# Patient Record
Sex: FEMALE | Race: BLACK OR AFRICAN AMERICAN | NOT HISPANIC OR LATINO | URBAN - METROPOLITAN AREA
[De-identification: names, ages, dates, MRNs, and addresses within clinical notes are randomized per-mention and may not be internally consistent; named-entity substitution may affect disease eponyms.]

---

## 2022-03-15 ENCOUNTER — OFFICE VISIT (OUTPATIENT)
Dept: URBAN - METROPOLITAN AREA CLINIC 40 | Facility: CLINIC | Age: 57
End: 2022-03-15
Payer: SELF-PAY

## 2022-03-15 ENCOUNTER — WEB ENCOUNTER (OUTPATIENT)
Dept: URBAN - METROPOLITAN AREA CLINIC 40 | Facility: CLINIC | Age: 57
End: 2022-03-15

## 2022-03-15 VITALS
SYSTOLIC BLOOD PRESSURE: 104 MMHG | WEIGHT: 227 LBS | DIASTOLIC BLOOD PRESSURE: 62 MMHG | TEMPERATURE: 98.7 F | HEART RATE: 55 BPM | HEIGHT: 61 IN | BODY MASS INDEX: 42.86 KG/M2

## 2022-03-15 DIAGNOSIS — Z80.0 FAMILY HISTORY OF RECTAL CANCER: ICD-10-CM

## 2022-03-15 DIAGNOSIS — R13.10 ODYNOPHAGIA: ICD-10-CM

## 2022-03-15 DIAGNOSIS — Z90.49 HISTORY OF APPENDECTOMY: ICD-10-CM

## 2022-03-15 DIAGNOSIS — E66.9 OBESITY (BMI 30-39.9): ICD-10-CM

## 2022-03-15 DIAGNOSIS — K21.9 GERD: ICD-10-CM

## 2022-03-15 PROBLEM — 414916001 OBESITY: Status: ACTIVE | Noted: 2022-03-15

## 2022-03-15 PROBLEM — 428251008 HISTORY OF APPENDECTOMY: Status: ACTIVE | Noted: 2022-03-15

## 2022-03-15 PROCEDURE — 99203 OFFICE O/P NEW LOW 30 MIN: CPT | Performed by: PHYSICIAN ASSISTANT

## 2022-03-15 RX ORDER — HYDROCHLOROTHIAZIDE 25 MG/1
1 TABLET IN THE MORNING TABLET ORAL ONCE A DAY
Status: ACTIVE | COMMUNITY

## 2022-03-15 RX ORDER — TELMISARTAN AND HYDROCHLOROTHIAZIDE 12.5; 8 MG/1; MG/1
1 TABLET TABLET ORAL ONCE A DAY
Status: ACTIVE | COMMUNITY

## 2022-03-15 RX ORDER — METOPROLOL SUCCINATE 50 MG/1
1 TABLET TABLET, FILM COATED, EXTENDED RELEASE ORAL ONCE A DAY
Status: ACTIVE | COMMUNITY

## 2022-03-15 RX ORDER — PANTOPRAZOLE SODIUM 40 MG/1
1 TABLET TABLET, DELAYED RELEASE ORAL ONCE A DAY
OUTPATIENT

## 2022-03-15 RX ORDER — PANTOPRAZOLE SODIUM 40 MG/1
1 TABLET TABLET, DELAYED RELEASE ORAL ONCE A DAY
Status: ACTIVE | COMMUNITY

## 2022-03-15 NOTE — HPI-TODAY'S VISIT:
Ms Bergeron is a 56-year-old black female who presents to the office today to discuss scheduling upper endoscopy.  Long history of GERD, obesity, hypertension, hyperlipidemia.  Admits that she has learned to avoid foods such as tomato-based products which cause more heartburn reflux and is taking pantoprazole 40 mg daily.  Formally followed by Dr. Olivares in the area, she had a reportedly normal colonoscopy in the last 2 to 3 years.  Family history of rectal cancer in her sister.  She also had an EGD years ago for follow-up of GERD but does not have reports of that procedure.  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  Occasional halitosis with this that she attributes to the fishbone.  Has had an episode of "coughing up something" and believes it may have been flash irritated by the fishbone.  Otherwise as mentioned, no significant dysphagia to foods or pills.  Denies significant abdominal pain.  Normal bowel habits without any hematochezia or melena.  History of appendectomy last November.  Covid infection in the last 2 years.  Non-smoker.  Does not drink alcohol.  No use of NSAIDs.

## 2022-03-15 NOTE — PHYSICAL EXAM CONSTITUTIONAL:
well developed, obese, in no acute distress , ambulating without difficulty , normal communication ability

## 2022-03-18 PROBLEM — 235595009 GASTROESOPHAGEAL REFLUX DISEASE: Status: ACTIVE | Noted: 2022-03-15

## 2022-04-04 ENCOUNTER — OFFICE VISIT (OUTPATIENT)
Dept: URBAN - METROPOLITAN AREA SURGERY CENTER 30 | Facility: SURGERY CENTER | Age: 57
End: 2022-04-04
Payer: SELF-PAY

## 2022-04-04 ENCOUNTER — CLAIMS CREATED FROM THE CLAIM WINDOW (OUTPATIENT)
Dept: URBAN - METROPOLITAN AREA CLINIC 4 | Facility: CLINIC | Age: 57
End: 2022-04-04
Payer: SELF-PAY

## 2022-04-04 DIAGNOSIS — K29.60 ADENOPAPILLOMATOSIS GASTRICA: ICD-10-CM

## 2022-04-04 DIAGNOSIS — B96.81 BACTERIAL INFECTION DUE TO H. PYLORI: ICD-10-CM

## 2022-04-04 DIAGNOSIS — B96.81 HELICOBACTER PYLORI [H. PYLORI] AS THE CAUSE OF DISEASES CLASSIFIED ELSEWHERE: ICD-10-CM

## 2022-04-04 DIAGNOSIS — K31.A11 INTESTINAL METAPLASIA OF ANTRUM OF STOMACH WITHOUT DYSPLASIA: ICD-10-CM

## 2022-04-04 DIAGNOSIS — K21.9 GASTRO-ESOPHAGEAL REFLUX DISEASE WITHOUT ESOPHAGITIS: ICD-10-CM

## 2022-04-04 DIAGNOSIS — K29.60 OTHER GASTRITIS WITHOUT BLEEDING: ICD-10-CM

## 2022-04-04 DIAGNOSIS — K21.9 ACID REFLUX: ICD-10-CM

## 2022-04-04 PROCEDURE — 88305 TISSUE EXAM BY PATHOLOGIST: CPT | Performed by: PATHOLOGY

## 2022-04-04 PROCEDURE — 43239 EGD BIOPSY SINGLE/MULTIPLE: CPT | Performed by: INTERNAL MEDICINE

## 2022-04-04 PROCEDURE — 88342 IMHCHEM/IMCYTCHM 1ST ANTB: CPT | Performed by: PATHOLOGY

## 2022-04-04 PROCEDURE — G8907 PT DOC NO EVENTS ON DISCHARG: HCPCS | Performed by: INTERNAL MEDICINE

## 2022-04-04 PROCEDURE — 88312 SPECIAL STAINS GROUP 1: CPT | Performed by: PATHOLOGY

## 2022-04-21 ENCOUNTER — TELEPHONE ENCOUNTER (OUTPATIENT)
Dept: URBAN - METROPOLITAN AREA CLINIC 40 | Facility: CLINIC | Age: 57
End: 2022-04-21

## 2022-04-21 RX ORDER — CLARITHROMYCIN 500 MG/1
1 TABLET TABLET, FILM COATED ORAL
Qty: 28 TABLET | Refills: 0 | OUTPATIENT
Start: 2022-04-21 | End: 2022-05-05

## 2022-04-21 RX ORDER — AMOXICILLIN 500 MG/1
2 CAPSULES CAPSULE ORAL
Qty: 56 CAPSULE | Refills: 0 | OUTPATIENT
Start: 2022-04-21 | End: 2022-05-05

## 2022-05-03 ENCOUNTER — OFFICE VISIT (OUTPATIENT)
Dept: URBAN - METROPOLITAN AREA CLINIC 40 | Facility: CLINIC | Age: 57
End: 2022-05-03
Payer: SELF-PAY

## 2022-05-03 ENCOUNTER — LAB OUTSIDE AN ENCOUNTER (OUTPATIENT)
Dept: URBAN - METROPOLITAN AREA CLINIC 40 | Facility: CLINIC | Age: 57
End: 2022-05-03

## 2022-05-03 DIAGNOSIS — R13.10 ODYNOPHAGIA: ICD-10-CM

## 2022-05-03 DIAGNOSIS — B96.81 HELICOBACTER PYLORI [H. PYLORI] AS THE CAUSE OF DISEASES CLASSIFIED ELSEWHERE: ICD-10-CM

## 2022-05-03 PROBLEM — 30233002 ODYNOPHAGIA: Status: ACTIVE | Noted: 2022-03-15

## 2022-05-03 PROCEDURE — 99214 OFFICE O/P EST MOD 30 MIN: CPT | Performed by: INTERNAL MEDICINE

## 2022-05-03 RX ORDER — HYDROCHLOROTHIAZIDE 25 MG/1
1 TABLET IN THE MORNING TABLET ORAL ONCE A DAY
Status: ACTIVE | COMMUNITY

## 2022-05-03 RX ORDER — TELMISARTAN AND HYDROCHLOROTHIAZIDE 12.5; 8 MG/1; MG/1
1 TABLET TABLET ORAL ONCE A DAY
Status: ACTIVE | COMMUNITY

## 2022-05-03 RX ORDER — CLARITHROMYCIN 500 MG/1
1 TABLET TABLET, FILM COATED ORAL
OUTPATIENT
Start: 2022-04-21

## 2022-05-03 RX ORDER — METOPROLOL SUCCINATE 50 MG/1
1 TABLET TABLET, FILM COATED, EXTENDED RELEASE ORAL ONCE A DAY
Status: ACTIVE | COMMUNITY

## 2022-05-03 RX ORDER — PANTOPRAZOLE SODIUM 40 MG/1
1 TABLET TABLET, DELAYED RELEASE ORAL ONCE A DAY
OUTPATIENT

## 2022-05-03 RX ORDER — SUCRALFATE 1 G/1
1 TABLET DISSOLVED IN 4 OUNCES OF WATER ON AN EMPTY STOMACH TABLET ORAL TWICE A DAY
Qty: 60 | Refills: 1 | OUTPATIENT
Start: 2022-05-03 | End: 2022-07-02

## 2022-05-03 RX ORDER — PANTOPRAZOLE SODIUM 40 MG/1
1 TABLET TABLET, DELAYED RELEASE ORAL ONCE A DAY
Status: ACTIVE | COMMUNITY

## 2022-05-03 RX ORDER — CLARITHROMYCIN 500 MG/1
1 TABLET TABLET, FILM COATED ORAL
Qty: 28 TABLET | Refills: 0 | Status: ACTIVE | COMMUNITY
Start: 2022-04-21 | End: 2022-05-05

## 2022-05-03 RX ORDER — AMOXICILLIN 500 MG/1
2 CAPSULES CAPSULE ORAL
OUTPATIENT
Start: 2022-04-21

## 2022-05-03 RX ORDER — AMOXICILLIN 500 MG/1
2 CAPSULES CAPSULE ORAL
Qty: 56 CAPSULE | Refills: 0 | Status: ACTIVE | COMMUNITY
Start: 2022-04-21 | End: 2022-05-05

## 2022-05-03 NOTE — HPI-TODAY'S VISIT:
Ms. Figueroa presents to clinic for follow-up.  She was seen on March 15 complaining of odynophagia and halitosis.  She was started on pantoprazole and an EGD was obtained.  EGD on April 4 was significant for a small hiatal hernia.  She had reflux esophagitis and H. pylori positive gastritis.  She has been started on triple therapy.  She started about a week of therapy.  Patient states she still having the sensation of feeling like something is in her throat.  She denies any nausea or abdominal pain.

## 2022-05-03 NOTE — PHYSICAL EXAM CONSTITUTIONAL:
Obese BF well developed, well nourished , in no acute distress , ambulating without difficulty , normal communication ability

## 2022-05-24 ENCOUNTER — OFFICE VISIT (OUTPATIENT)
Dept: URBAN - METROPOLITAN AREA CLINIC 39 | Facility: CLINIC | Age: 57
End: 2022-05-24

## 2022-06-29 ENCOUNTER — ERX REFILL RESPONSE (OUTPATIENT)
Dept: URBAN - METROPOLITAN AREA CLINIC 40 | Facility: CLINIC | Age: 57
End: 2022-06-29

## 2022-06-29 RX ORDER — SUCRALFATE 1 G/1
TAKE ONE TABLET DISSOLVED IN 4 OUNCES OF WATER ON AN AMPTY STOMACH TWO TIMES A DAY TABLET ORAL
Qty: 60 TABLET | Refills: 0 | OUTPATIENT

## 2022-06-29 RX ORDER — SUCRALFATE 1 G/1
1 TABLET DISSOLVED IN 4 OUNCES OF WATER ON AN EMPTY STOMACH TABLET ORAL TWICE A DAY
Qty: 60 | Refills: 1 | OUTPATIENT

## 2022-08-26 ENCOUNTER — ERX REFILL RESPONSE (OUTPATIENT)
Dept: URBAN - METROPOLITAN AREA CLINIC 40 | Facility: CLINIC | Age: 57
End: 2022-08-26

## 2022-08-26 RX ORDER — SUCRALFATE 1 G/1
TAKE ONE TABLET DISSOLVED IN 4 OUNCES OF WATER ON AN EMPTY STOMACH TWO TIMES A DAY TABLET ORAL
Qty: 60 TABLET | Refills: 0 | OUTPATIENT

## 2022-10-03 ENCOUNTER — ERX REFILL RESPONSE (OUTPATIENT)
Dept: URBAN - METROPOLITAN AREA CLINIC 40 | Facility: CLINIC | Age: 57
End: 2022-10-03

## 2022-10-03 RX ORDER — SUCRALFATE 1 G/1
TAKE ONE TABLET DISSOLVED IN 4 OUNCES OF WATER BY MOUTH ON AN EMPTY STOMACH TWO TIMES A DAY TABLET ORAL
Qty: 60 TABLET | Refills: 0 | OUTPATIENT

## 2022-11-28 ENCOUNTER — ERX REFILL RESPONSE (OUTPATIENT)
Dept: URBAN - METROPOLITAN AREA CLINIC 40 | Facility: CLINIC | Age: 57
End: 2022-11-28

## 2022-11-28 RX ORDER — SUCRALFATE 1 G/1
TAKE 1 TABLET AND DISSOLVED IN 4 OZ OF WATER BY MOUTH ON AN EMPTY STOMACH TWO TIMES A DAY FOR 30 DAYS TABLET ORAL
Qty: 60 TABLET | Refills: 0 | OUTPATIENT

## 2023-01-04 ENCOUNTER — DASHBOARD ENCOUNTERS (OUTPATIENT)
Age: 58
End: 2023-01-04

## 2023-01-04 ENCOUNTER — OFFICE VISIT (OUTPATIENT)
Dept: URBAN - METROPOLITAN AREA CLINIC 40 | Facility: CLINIC | Age: 58
End: 2023-01-04
Payer: SELF-PAY

## 2023-01-04 VITALS
DIASTOLIC BLOOD PRESSURE: 80 MMHG | HEART RATE: 73 BPM | BODY MASS INDEX: 41.54 KG/M2 | WEIGHT: 220 LBS | HEIGHT: 61 IN | SYSTOLIC BLOOD PRESSURE: 132 MMHG

## 2023-01-04 DIAGNOSIS — R10.13 DYSPEPSIA: ICD-10-CM

## 2023-01-04 DIAGNOSIS — Z86.19 HISTORY OF HELICOBACTER PYLORI INFECTION: ICD-10-CM

## 2023-01-04 DIAGNOSIS — A04.8 HELICOBACTER PYLORI (H. PYLORI): ICD-10-CM

## 2023-01-04 PROCEDURE — 99214 OFFICE O/P EST MOD 30 MIN: CPT | Performed by: INTERNAL MEDICINE

## 2023-01-04 RX ORDER — PANTOPRAZOLE SODIUM 40 MG/1
1 TABLET TABLET, DELAYED RELEASE ORAL ONCE A DAY
Qty: 90 | Refills: 0

## 2023-01-04 RX ORDER — AMOXICILLIN 500 MG/1
2 CAPSULES CAPSULE ORAL
Status: ON HOLD | COMMUNITY
Start: 2022-04-21

## 2023-01-04 RX ORDER — HYDROCHLOROTHIAZIDE 25 MG/1
1 TABLET IN THE MORNING TABLET ORAL ONCE A DAY
Status: ACTIVE | COMMUNITY

## 2023-01-04 RX ORDER — SUCRALFATE 1 G/1
TAKE 1 TABLET AND DISSOLVED IN 4 OZ OF WATER BY MOUTH ON AN EMPTY STOMACH TWO TIMES A DAY FOR 30 DAYS TABLET ORAL
Qty: 60 TABLET | Refills: 0 | Status: ON HOLD | COMMUNITY

## 2023-01-04 RX ORDER — CLARITHROMYCIN 500 MG/1
1 TABLET TABLET, FILM COATED ORAL
Status: ON HOLD | COMMUNITY
Start: 2022-04-21

## 2023-01-04 RX ORDER — SUCRALFATE 1 G/1
1 TABLET ON AN EMPTY STOMACH TABLET ORAL TWICE A DAY
Qty: 180 TABLET | Refills: 0

## 2023-01-04 RX ORDER — TELMISARTAN AND HYDROCHLOROTHIAZIDE 12.5; 8 MG/1; MG/1
1 TABLET TABLET ORAL ONCE A DAY
Status: ACTIVE | COMMUNITY

## 2023-01-04 RX ORDER — PANTOPRAZOLE SODIUM 40 MG/1
1 TABLET TABLET, DELAYED RELEASE ORAL ONCE A DAY
Status: ON HOLD | COMMUNITY

## 2023-01-04 RX ORDER — METOPROLOL SUCCINATE 50 MG/1
1 TABLET TABLET, FILM COATED, EXTENDED RELEASE ORAL ONCE A DAY
Status: ACTIVE | COMMUNITY

## 2023-01-04 NOTE — HPI-TODAY'S VISIT:
Ms. Figueroa presents to clinic for follow-up.  She was last seen in May 2022.  Recall patient had an EGD in April significant for hiatal hernia, reflux esophagitis and H. pylori positive gastritis.  She was treated with triple therapy.  H. pylori breath testing was ordered but patient did not return to have this done.  Patient states she felt better with the antibiotics.  She subsequently had an upper respiratory infection had a different set of antibiotics.  She was okay until 2 weeks ago when she started to have the same symptoms in terms of nausea and dyspepsia.  He is currently moving her bowels daily.  She is not on any acid reflux medicine currently.

## 2023-01-06 LAB
H PYLORI BREATH TEST: NOT DETECTED
H. PYLORI BREATH TEST: NOT DETECTED
INTERPRETATION: NOT DETECTED

## 2023-01-11 ENCOUNTER — LAB OUTSIDE AN ENCOUNTER (OUTPATIENT)
Dept: URBAN - METROPOLITAN AREA CLINIC 40 | Facility: CLINIC | Age: 58
End: 2023-01-11

## 2023-01-11 ENCOUNTER — TELEPHONE ENCOUNTER (OUTPATIENT)
Dept: URBAN - METROPOLITAN AREA CLINIC 40 | Facility: CLINIC | Age: 58
End: 2023-01-11

## 2023-04-07 ENCOUNTER — ERX REFILL RESPONSE (OUTPATIENT)
Dept: URBAN - METROPOLITAN AREA CLINIC 40 | Facility: CLINIC | Age: 58
End: 2023-04-07

## 2023-04-07 RX ORDER — PANTOPRAZOLE 40 MG/1
TAKE ONE TABLET BY MOUTH DAILY TABLET, DELAYED RELEASE ORAL
Qty: 90 TABLET | Refills: 0 | OUTPATIENT

## 2023-04-07 RX ORDER — PANTOPRAZOLE SODIUM 40 MG/1
1 TABLET TABLET, DELAYED RELEASE ORAL ONCE A DAY
Qty: 90 | Refills: 0 | OUTPATIENT